# Patient Record
Sex: FEMALE | Race: WHITE | NOT HISPANIC OR LATINO | Employment: FULL TIME | URBAN - METROPOLITAN AREA
[De-identification: names, ages, dates, MRNs, and addresses within clinical notes are randomized per-mention and may not be internally consistent; named-entity substitution may affect disease eponyms.]

---

## 2024-11-25 ENCOUNTER — OFFICE VISIT (OUTPATIENT)
Dept: FAMILY MEDICINE | Facility: CLINIC | Age: 24
End: 2024-11-25
Payer: COMMERCIAL

## 2024-11-25 VITALS
OXYGEN SATURATION: 96 % | WEIGHT: 292.81 LBS | HEART RATE: 82 BPM | RESPIRATION RATE: 18 BRPM | BODY MASS INDEX: 53.55 KG/M2 | SYSTOLIC BLOOD PRESSURE: 118 MMHG | DIASTOLIC BLOOD PRESSURE: 74 MMHG

## 2024-11-25 DIAGNOSIS — J03.90 TONSILLITIS: Primary | ICD-10-CM

## 2024-11-25 DIAGNOSIS — J02.9 SORE THROAT: ICD-10-CM

## 2024-11-25 LAB
CTP QC/QA: YES
MOLECULAR STREP A: NEGATIVE

## 2024-11-25 PROCEDURE — 3044F HG A1C LEVEL LT 7.0%: CPT | Mod: S$GLB,,,

## 2024-11-25 PROCEDURE — 87651 STREP A DNA AMP PROBE: CPT | Mod: QW,S$GLB,,

## 2024-11-25 PROCEDURE — 3074F SYST BP LT 130 MM HG: CPT | Mod: S$GLB,,,

## 2024-11-25 PROCEDURE — 3008F BODY MASS INDEX DOCD: CPT | Mod: S$GLB,,,

## 2024-11-25 PROCEDURE — 1159F MED LIST DOCD IN RCRD: CPT | Mod: S$GLB,,,

## 2024-11-25 PROCEDURE — 3078F DIAST BP <80 MM HG: CPT | Mod: S$GLB,,,

## 2024-11-25 PROCEDURE — 1160F RVW MEDS BY RX/DR IN RCRD: CPT | Mod: S$GLB,,,

## 2024-11-25 PROCEDURE — 99999 PR PBB SHADOW E&M-EST. PATIENT-LVL III: CPT | Mod: PBBFAC,,,

## 2024-11-25 PROCEDURE — 99212 OFFICE O/P EST SF 10 MIN: CPT | Mod: S$GLB,,,

## 2024-11-25 RX ORDER — AMOXICILLIN 500 MG/1
500 TABLET, FILM COATED ORAL EVERY 12 HOURS
Qty: 20 TABLET | Refills: 0 | Status: SHIPPED | OUTPATIENT
Start: 2024-11-25 | End: 2024-12-05

## 2024-11-25 NOTE — PROGRESS NOTES
"[place], [place]    Patient ID: Anna Tabares is a 24 y.o. female.    Chief Complaint: Sore Throat (Patient presents for sore throat, cough and recent ear pain. She's been taking OTC medication for throat "drip" and states it hasn't helped. )    History of Present Illness  Patient presents today with a sore throat.    HISTORY OF PRESENT ILLNESS:  She reports a very bad sore throat with swollen tonsils. Symptoms have been present for 2-3 days, less than five days in total. She has a history of frequent tonsil issues. She also reports a mild cough, related to the sore throat, with no significant sputum production or drainage. She experiences ear discomfort, describing her ears as "a little sore," without drainage. The ear discomfort is suspected to be related to her sore throat and associated inflammation. She has attempted self-treatment for her condition.    ROS  General: -fever, -chills, -fatigue, -weight gain, -weight loss  Eyes: -vision changes, -redness, -discharge  ENT: -ear pain, -nasal congestion, +sore throat  Cardiovascular: -chest pain, -palpitations, -lower extremity edema  Respiratory: +cough, -shortness of breath  Gastrointestinal: -abdominal pain, -nausea, -vomiting, -diarrhea, -constipation, -blood in stool  Genitourinary: -dysuria, -hematuria, -frequency  Musculoskeletal: -joint pain, -muscle pain  Skin: -rash, -lesion  Neurological: -headache, -dizziness, -numbness, -tingling  Psychiatric: -anxiety, -depression, -sleep difficulty     Physical Exam   General: No acute distress. Obese.   Eyes: EOMI. Sclerae anicteric.  HENT: Normocephalic. Atraumatic. Nares patent. Moist oral mucosa. 3+ tonsillar edema bilaterally, no exudates noted. Significant pharyngeal erythema.   Ears: Bilateral TMs clear. Bilateral EACs clear.   Cardiovascular: Regular rate. Regular rhythm. No murmurs. No rubs. No gallops. Normal S1, S2.  Respiratory: Normal respiratory effort. Clear to auscultation " "bilaterally. No rales. No rhonchi. No wheezing.  Abdomen: Soft. Non-tender. Non-distended. Normoactive bowel sounds.  Musculoskeletal: No  obvious deformity.  Extremities: No lower extremity edema.  Neurological: Alert & oriented x3. No slurred speech. Normal gait.  Psychiatric: Normal mood. Normal affect. Good insight. Good judgment.  Skin: Warm. Dry. No rash.   Neck: Moderate anterior cervical lymphadenopathy.     Assessment & Plan  Anna Bush" was seen today for sore throat.    Diagnoses and all orders for this visit:    Tonsillitis    Sore throat  -     POCT Strep A, Molecular    Other orders  -     amoxicillin (AMOXIL) 500 MG Tab; Take 1 tablet (500 mg total) by mouth every 12 (twelve) hours. for 10 days      Suspected strep throat despite negative rapid strep test due to clinical presentation (swollen tonsils, sore throat, ear discomfort)  Will treat empirically with antibiotics due to symptom severity and upcoming travel plans    ACUTE TONSILLITIS:  - Negative strep test in office.   - Started amoxicillin.  - Recommend taking yogurt or a probiotic while on amoxicillin to prevent GI side effects.  - Discussed contagious nature of illness  - Symptoms should start improving within 24-48 hours of starting antibiotics.  - Patient to change toothbrush 3 days after starting antibiotics.  - Recommend drinking plenty of fluids.  - Patient can use warm salt water gargles or chloraseptic numbing sprays for throat pain relief.  - Started amoxicillin.  - Recommend taking yogurt or a probiotic while on amoxicillin to prevent GI side effects.  - Continue Tylenol and Motrin as needed for pain relief.    RTC prn. To ED for emergent s/s. Med and adverse effects discussed.     This note was generated with the assistance of ambient listening technology. Verbal consent was obtained by the patient and accompanying visitor(s) for the recording of patient appointment to facilitate this note. I attest to having reviewed and edited " the generated note for accuracy, though some syntax or spelling errors may persist. Please contact the author of this note for any clarification.        ERICA RiddleC

## 2025-01-03 NOTE — PROGRESS NOTES
"Subjective:       Patient ID: Anna Tabares is a 24 y.o. female.    Chief Complaint: Medication Management (Getting on meds for weight loss and mental health.) and Irritable Bowel Syndrome    Anna Tabares (Gabbi) presents to the clinic today wellness  Established patient within the clinic, new patient to myself    Under the care of the following:  PCP: Dr. Conteh  Gynecology: DONNA Mart     Patient Active Problem List  Diagnosis  · Adolescent idiopathic scoliosis of lumbar region  · Moderate episode of recurrent major depressive disorder  · Pap smear for cervical cancer screening  · Attention deficit hyperactivity disorder (ADHD), combined type    Depression/Anxiety  Long standing hx- reports she has been on medications/ therapy since age 6  Reports multiple medication trials and failures over the years  Reports being on Autism Spectrum  States has not seen a mental health/behavioral health provider since 2021  Available records shows past medications to include the following: most recent Lexapro, vyvanse, viibryd, cymbalta, buspar, zoloft.   Reports has not been in due to insurance issues  Reports left her job at [employer] in August, got  at the end of August  Started a new job at [employer] in November    Reports increased anxiety along with depression, anxiety comes out in forms of irritability with outburst. Depression related to recent weight gain despite monitoring diet  Reports issues with her stomach- loose stools, bloating, expresses concerns of IBS. Has been increasing vegetables as she does will with this.   Interested in weight loss medications.       Review of Systems    Patient Active Problem List   Diagnosis    Adolescent idiopathic scoliosis of lumbar region    Moderate episode of recurrent major depressive disorder    Pap smear for cervical cancer screening    Attention deficit hyperactivity disorder (ADHD), combined type       Objective:      Physical Exam  Vitals and nursing note " "reviewed.   Constitutional:       General: She is not in acute distress.     Appearance: Normal appearance. She is obese. She is not ill-appearing.   Eyes:      Extraocular Movements: Extraocular movements intact.      Conjunctiva/sclera: Conjunctivae normal.   Cardiovascular:      Rate and Rhythm: Normal rate and regular rhythm.      Heart sounds: Normal heart sounds. No murmur heard.  Pulmonary:      Effort: Pulmonary effort is normal. No respiratory distress.      Breath sounds: Normal breath sounds. No wheezing.   Abdominal:      General: Bowel sounds are normal. There is no distension.   Skin:     General: Skin is warm and dry.      Findings: No rash.   Neurological:      Mental Status: She is alert and oriented to person, place, and time. Mental status is at baseline.   Psychiatric:         Mood and Affect: Mood normal.         Behavior: Behavior normal.         Thought Content: Thought content normal.         Judgment: Judgment normal.         Lab Results   Component Value Date    WBC 8.69 06/28/2024    HGB 12.6 06/28/2024    HCT 38.8 06/28/2024     06/28/2024    CHOL 143 06/28/2024    TRIG 51 06/28/2024    HDL 47 06/28/2024     06/28/2024    K 4.4 06/28/2024     06/28/2024    CREATININE 0.8 06/28/2024    BUN 13 06/28/2024    CO2 26 06/28/2024    TSH 2.100 06/28/2024    HGBA1C 5.2 06/28/2024     The ASCVD Risk score ([place] DK, et al., 2019) failed to calculate for the following reasons:    The 2019 ASCVD risk score is only valid for ages 40 to 79  Visit Vitals  /78 (BP Location: Right arm, Patient Position: Sitting)   Pulse 71   Resp 15   Ht 5' 2" (1.575 m)   Wt (!) 137.2 kg (302 lb 6.4 oz)   SpO2 97%   BMI 55.31 kg/m²      Assessment:       1. Encounter for adult wellness visit    2. Encounter for lipid screening for cardiovascular disease    3. Fatigue, unspecified type    4. JOSSE (generalized anxiety disorder)    5. Moderate episode of recurrent major depressive disorder    6. " Morbid obesity with body mass index (BMI) of 50.0 to 59.9 in adult    7. Generalized abdominal pain    8. Autism disorder        Plan:       1. Encounter for adult wellness visit  -     Comprehensive metabolic panel; Future; Expected date: 01/07/2025  -     Lipid panel; Future; Expected date: 01/07/2025  Obtain fasting blood work for review.   2. Encounter for lipid screening for cardiovascular disease  -     Lipid panel; Future; Expected date: 01/07/2025    3. Fatigue, unspecified type  -     TSH; Future; Expected date: 01/07/2025  -     CBC auto differential; Future; Expected date: 01/07/2025    4. JOSSE (generalized anxiety disorder)  -     EScitalopram oxalate (LEXAPRO) 10 MG tablet; Take 1 tablet (10 mg total) by mouth once daily.  Dispense: 90 tablet; Refill: 1  -     busPIRone (BUSPAR) 10 MG tablet; Take 1 tablet (10 mg total) by mouth 3 (three) times daily as needed (anxiety).  Dispense: 90 tablet; Refill: 11  Will restart last prescribed medications.  Discussed referral to behavioral health/ mental health for re-evaluation   Provided contact information to [facility] in [place]   Restart lexapro daily- allow time to reach therapeutic level  Buspar as needed for anxiety     5. Moderate episode of recurrent major depressive disorder  -     EScitalopram oxalate (LEXAPRO) 10 MG tablet; Take 1 tablet (10 mg total) by mouth once daily.  Dispense: 90 tablet; Refill: 1    6. Morbid obesity with body mass index (BMI) of 50.0 to 59.9 in adult  Discussed Zepbound/Wegovy with patient  Unsure of insurance coverage- patient to notify clinic of coverage  7. Generalized abdominal pain  -     US Abdomen Complete; Future; Expected date: 01/07/2025    8. Autism disorder       No follow-ups on file.      Future Appointments       Date Provider Specialty Appt Notes    1/14/2025  Lab lab    1/14/2025  Radiology     3/13/2025 Evelin Coneth MD Family Medicine Follow up

## 2025-01-07 ENCOUNTER — PATIENT MESSAGE (OUTPATIENT)
Dept: FAMILY MEDICINE | Facility: CLINIC | Age: 25
End: 2025-01-07

## 2025-01-07 ENCOUNTER — OFFICE VISIT (OUTPATIENT)
Dept: FAMILY MEDICINE | Facility: CLINIC | Age: 25
End: 2025-01-07
Payer: COMMERCIAL

## 2025-01-07 VITALS
SYSTOLIC BLOOD PRESSURE: 122 MMHG | DIASTOLIC BLOOD PRESSURE: 78 MMHG | HEIGHT: 62 IN | WEIGHT: 293 LBS | OXYGEN SATURATION: 97 % | HEART RATE: 71 BPM | BODY MASS INDEX: 53.92 KG/M2 | RESPIRATION RATE: 15 BRPM

## 2025-01-07 DIAGNOSIS — Z13.220 ENCOUNTER FOR LIPID SCREENING FOR CARDIOVASCULAR DISEASE: ICD-10-CM

## 2025-01-07 DIAGNOSIS — F41.1 GAD (GENERALIZED ANXIETY DISORDER): ICD-10-CM

## 2025-01-07 DIAGNOSIS — R10.84 GENERALIZED ABDOMINAL PAIN: ICD-10-CM

## 2025-01-07 DIAGNOSIS — E66.01 MORBID OBESITY WITH BODY MASS INDEX (BMI) OF 50.0 TO 59.9 IN ADULT: ICD-10-CM

## 2025-01-07 DIAGNOSIS — F33.1 MODERATE EPISODE OF RECURRENT MAJOR DEPRESSIVE DISORDER: ICD-10-CM

## 2025-01-07 DIAGNOSIS — Z00.00 ENCOUNTER FOR ADULT WELLNESS VISIT: Primary | ICD-10-CM

## 2025-01-07 DIAGNOSIS — Z13.6 ENCOUNTER FOR LIPID SCREENING FOR CARDIOVASCULAR DISEASE: ICD-10-CM

## 2025-01-07 DIAGNOSIS — R53.83 FATIGUE, UNSPECIFIED TYPE: ICD-10-CM

## 2025-01-07 DIAGNOSIS — F84.0 AUTISM DISORDER: ICD-10-CM

## 2025-01-07 PROCEDURE — 1160F RVW MEDS BY RX/DR IN RCRD: CPT | Mod: S$GLB,,, | Performed by: NURSE PRACTITIONER

## 2025-01-07 PROCEDURE — 99395 PREV VISIT EST AGE 18-39: CPT | Mod: S$GLB,,, | Performed by: NURSE PRACTITIONER

## 2025-01-07 PROCEDURE — 1159F MED LIST DOCD IN RCRD: CPT | Mod: S$GLB,,, | Performed by: NURSE PRACTITIONER

## 2025-01-07 PROCEDURE — 3008F BODY MASS INDEX DOCD: CPT | Mod: S$GLB,,, | Performed by: NURSE PRACTITIONER

## 2025-01-07 PROCEDURE — 99999 PR PBB SHADOW E&M-EST. PATIENT-LVL IV: CPT | Mod: PBBFAC,,, | Performed by: NURSE PRACTITIONER

## 2025-01-07 PROCEDURE — 3074F SYST BP LT 130 MM HG: CPT | Mod: S$GLB,,, | Performed by: NURSE PRACTITIONER

## 2025-01-07 PROCEDURE — 3078F DIAST BP <80 MM HG: CPT | Mod: S$GLB,,, | Performed by: NURSE PRACTITIONER

## 2025-01-07 RX ORDER — ESCITALOPRAM OXALATE 10 MG/1
10 TABLET ORAL DAILY
Qty: 90 TABLET | Refills: 1 | Status: SHIPPED | OUTPATIENT
Start: 2025-01-07

## 2025-01-07 RX ORDER — BUSPIRONE HYDROCHLORIDE 10 MG/1
10 TABLET ORAL 3 TIMES DAILY PRN
Qty: 90 TABLET | Refills: 11 | Status: SHIPPED | OUTPATIENT
Start: 2025-01-07

## 2025-01-07 NOTE — PATIENT INSTRUCTIONS
Behavioral Health:     [address] [place]  [address] [phone]      Weight Loss:  Speak with your insurance to determine if the following are on formulary/ covered by your insurance:  Zepbound  Wegovy    Both are once weekly injectables

## 2025-01-14 ENCOUNTER — HOSPITAL ENCOUNTER (OUTPATIENT)
Dept: RADIOLOGY | Facility: HOSPITAL | Age: 25
Discharge: HOME | End: 2025-01-14
Attending: NURSE PRACTITIONER
Payer: COMMERCIAL

## 2025-01-14 DIAGNOSIS — R10.84 GENERALIZED ABDOMINAL PAIN: ICD-10-CM

## 2025-01-14 PROCEDURE — 76700 US EXAM ABDOM COMPLETE: CPT | Mod: 26,,, | Performed by: RADIOLOGY

## 2025-01-14 PROCEDURE — 76700 US EXAM ABDOM COMPLETE: CPT | Mod: TC

## 2025-03-07 ENCOUNTER — PATIENT MESSAGE (OUTPATIENT)
Dept: FAMILY MEDICINE | Facility: CLINIC | Age: 25
End: 2025-03-07
Payer: COMMERCIAL

## 2025-03-13 ENCOUNTER — LAB VISIT (OUTPATIENT)
Dept: LAB | Facility: HOSPITAL | Age: 25
End: 2025-03-13
Attending: NURSE PRACTITIONER
Payer: COMMERCIAL

## 2025-03-13 ENCOUNTER — OFFICE VISIT (OUTPATIENT)
Dept: FAMILY MEDICINE | Facility: CLINIC | Age: 25
End: 2025-03-13
Payer: COMMERCIAL

## 2025-03-13 VITALS
HEART RATE: 81 BPM | WEIGHT: 290 LBS | BODY MASS INDEX: 53.37 KG/M2 | HEIGHT: 62 IN | SYSTOLIC BLOOD PRESSURE: 126 MMHG | RESPIRATION RATE: 15 BRPM | DIASTOLIC BLOOD PRESSURE: 82 MMHG | OXYGEN SATURATION: 95 %

## 2025-03-13 DIAGNOSIS — E66.01 MORBID OBESITY WITH BODY MASS INDEX (BMI) OF 50.0 TO 59.9 IN ADULT: ICD-10-CM

## 2025-03-13 DIAGNOSIS — R89.9 ABNORMAL LABORATORY TEST: ICD-10-CM

## 2025-03-13 DIAGNOSIS — M79.18 MYALGIA, MULTIPLE SITES: ICD-10-CM

## 2025-03-13 DIAGNOSIS — F32.A DEPRESSIVE DISORDER: Primary | ICD-10-CM

## 2025-03-13 DIAGNOSIS — M25.50 GENERALIZED JOINT PAIN: ICD-10-CM

## 2025-03-13 LAB
CRP SERPL-MCNC: 16 MG/L (ref 0–8.2)
ERYTHROCYTE [SEDIMENTATION RATE] IN BLOOD BY WESTERGREN METHOD: 30 MM/HR (ref 0–20)
IRON SERPL-MCNC: 89 UG/DL (ref 30–160)
MAGNESIUM SERPL-MCNC: 2 MG/DL (ref 1.6–2.6)
SATURATED IRON: 23 % (ref 20–50)
TOTAL IRON BINDING CAPACITY: 380 UG/DL (ref 250–450)
TRANSFERRIN SERPL-MCNC: 257 MG/DL (ref 200–375)

## 2025-03-13 PROCEDURE — 3079F DIAST BP 80-89 MM HG: CPT | Mod: S$GLB,,, | Performed by: NURSE PRACTITIONER

## 2025-03-13 PROCEDURE — 83540 ASSAY OF IRON: CPT | Performed by: NURSE PRACTITIONER

## 2025-03-13 PROCEDURE — 86140 C-REACTIVE PROTEIN: CPT | Performed by: NURSE PRACTITIONER

## 2025-03-13 PROCEDURE — 99999 PR PBB SHADOW E&M-EST. PATIENT-LVL III: CPT | Mod: PBBFAC,,, | Performed by: NURSE PRACTITIONER

## 2025-03-13 PROCEDURE — 86038 ANTINUCLEAR ANTIBODIES: CPT | Performed by: NURSE PRACTITIONER

## 2025-03-13 PROCEDURE — 36415 COLL VENOUS BLD VENIPUNCTURE: CPT | Performed by: NURSE PRACTITIONER

## 2025-03-13 PROCEDURE — 86039 ANTINUCLEAR ANTIBODIES (ANA): CPT | Performed by: NURSE PRACTITIONER

## 2025-03-13 PROCEDURE — 99214 OFFICE O/P EST MOD 30 MIN: CPT | Mod: S$GLB,,, | Performed by: NURSE PRACTITIONER

## 2025-03-13 PROCEDURE — 3074F SYST BP LT 130 MM HG: CPT | Mod: S$GLB,,, | Performed by: NURSE PRACTITIONER

## 2025-03-13 PROCEDURE — 83735 ASSAY OF MAGNESIUM: CPT | Performed by: NURSE PRACTITIONER

## 2025-03-13 PROCEDURE — 85651 RBC SED RATE NONAUTOMATED: CPT | Performed by: NURSE PRACTITIONER

## 2025-03-13 PROCEDURE — 3008F BODY MASS INDEX DOCD: CPT | Mod: S$GLB,,, | Performed by: NURSE PRACTITIONER

## 2025-03-13 PROCEDURE — 82306 VITAMIN D 25 HYDROXY: CPT | Performed by: NURSE PRACTITIONER

## 2025-03-13 PROCEDURE — 1159F MED LIST DOCD IN RCRD: CPT | Mod: S$GLB,,, | Performed by: NURSE PRACTITIONER

## 2025-03-13 PROCEDURE — 86235 NUCLEAR ANTIGEN ANTIBODY: CPT | Mod: 59 | Performed by: NURSE PRACTITIONER

## 2025-03-13 RX ORDER — ESCITALOPRAM OXALATE 20 MG/1
20 TABLET ORAL DAILY
Qty: 90 TABLET | Refills: 3 | Status: SHIPPED | OUTPATIENT
Start: 2025-03-13 | End: 2026-03-13

## 2025-03-13 RX ORDER — TOPIRAMATE 25 MG/1
25 TABLET ORAL 2 TIMES DAILY
COMMUNITY
Start: 2025-02-24

## 2025-03-13 RX ORDER — PHENTERMINE HYDROCHLORIDE 37.5 MG/1
37.5 TABLET ORAL DAILY
COMMUNITY
Start: 2025-01-17

## 2025-03-13 NOTE — PROGRESS NOTES
"To Subjective:       Patient ID: Anna Tabares is a 24 y.o. female.    Chief Complaint: Follow-up (3 month), Medication Management (Wants to discuss lexapro), and Fibromyalgia (Wants to discuss)    Anna Tabares (Gabbi) presents to the clinic today for follow up Depression/Anxiety        Under the care of the following:  PCP: Dr. Conteh  Gynecology: DONNA Mart   Weight Management/Bariatric Medicine: Trish Nascimento, NP- C with [facility]     Patient Active Problem List  Diagnosis  ·Adolescent idiopathic scoliosis of lumbar region  ·Moderate episode of recurrent major depressive disorder  ·Pap smear for cervical cancer screening  ·Attention deficit hyperactivity disorder (ADHD), combined type     Depression/Anxiety  Long standing hx- reports she has been on medications/ therapy since age 6  Reports multiple medication trials and failures over the years  Reports being on Autism Spectrum  States has not seen a mental health/behavioral health provider since 2021  Available records shows past medications to include the following: most recent Lexapro, vyvanse, viibryd, cymbalta, buspar, zoloft.   Reports has not been in due to insurance issues  Reports left her job at [employer] in August, got  at the end of August  Started a new job at [employer] in November     Reports increased anxiety along with depression, anxiety comes out in forms of irritability with outburst. Depression related to recent weight gain despite monitoring diet  Reports issues with her stomach- loose stools, bloating, expresses concerns of IBS. Has been increasing vegetables as she does will with this.   Interested in weight loss medications.     Patient was started on Lexapro 10 mg and Buspar 10 mg TID   Reports that overall she was doing well with her recent treatment regimen until about 2 weeks ago when she began to have differences with her significant other.   Would like to discuss increase in Lexparo- had been on 20 mg tablets in the " past and reports that she feels like she did well on the medication from what she is able to remember.     Since her last office visit she has been seen by Bariatric Medicine/weight managemnet with [facility] and started on   phentermine (ADIPEX-P) 37.5 mg tablet 1 tablet daily and topiramate (TOPAMAX) 25 MG tablet 1 tablet in the morning and 1 tablet at bedtime    shows fills 1/17/2025 and 2/24/2025  This can definitely impact her underlying anxiety, but reports that she only noticed the recent increase in the past 2 weeks and has been on these medications since January    Expresses concerns related to not only multiple joint pains, but pain/ sensitivity to upper arms, back, legs, at time generalized body along with consistent fatigue  Reports family hx of fibromyalgia on mother's side  Has not noticed and joint swelling to any specific joints, maybe knees at times        Review of Systems    Problem List[1]    Objective:      Physical Exam  Vitals and nursing note reviewed.   Constitutional:       General: She is not in acute distress.     Appearance: Normal appearance. She is obese. She is not ill-appearing.   Eyes:      Extraocular Movements: Extraocular movements intact.      Conjunctiva/sclera: Conjunctivae normal.   Cardiovascular:      Rate and Rhythm: Normal rate and regular rhythm.      Heart sounds: Normal heart sounds. No murmur heard.  Pulmonary:      Effort: Pulmonary effort is normal. No respiratory distress.      Breath sounds: Normal breath sounds. No wheezing.   Abdominal:      General: Bowel sounds are normal. There is no distension.   Musculoskeletal:      Right shoulder: Tenderness present.      Left shoulder: Tenderness present.      Right upper arm: Tenderness present.      Left upper arm: Tenderness present.      Cervical back: Tenderness present.      Lumbar back: Tenderness present.      Right lower leg: Tenderness present.      Left lower leg: Tenderness present.      Comments:  "Tenderness is noted more to touch/palpation not necessarily joint related    Skin:     General: Skin is warm and dry.      Findings: No rash.   Neurological:      Mental Status: She is alert and oriented to person, place, and time. Mental status is at baseline.   Psychiatric:         Mood and Affect: Mood normal.         Behavior: Behavior normal.         Thought Content: Thought content normal.         Judgment: Judgment normal.         Lab Results   Component Value Date    WBC 9.52 01/14/2025    HGB 12.7 01/14/2025    HCT 39.5 01/14/2025     01/14/2025    CHOL 142 01/14/2025    TRIG 53 01/14/2025    HDL 47 01/14/2025    ALT 28 01/14/2025    AST 19 01/14/2025     01/14/2025    K 4.3 01/14/2025     01/14/2025    CREATININE 0.8 01/14/2025    BUN 13 01/14/2025    CO2 22 (L) 01/14/2025    TSH 3.375 01/14/2025    HGBA1C 5.2 06/28/2024     The ASCVD Risk score (Em CHU, et al., 2019) failed to calculate for the following reasons:    The 2019 ASCVD risk score is only valid for ages 40 to 79  Visit Vitals  /82 (BP Location: Right arm, Patient Position: Sitting)   Pulse 81   Resp 15   Ht 5' 2" (1.575 m)   Wt 131.5 kg (290 lb)   SpO2 95%   BMI 53.04 kg/m²      Assessment:       1. Morbid obesity with body mass index (BMI) of 50.0 to 59.9 in adult    2. Generalized joint pain    3. Myalgia, multiple sites    4. Abnormal laboratory test        Plan:       1. Depressive Disorder   -     EScitalopram oxalate (LEXAPRO) 20 MG tablet; Take 1 tablet (20 mg total) by mouth once daily.  Dispense: 90 tablet; Refill: 3  Increase Lexapro to 20 mg daily  Continue Buspar as prescribed   Avoid additional intake of stimulants- caffeine as this can increase anxiety    Follow up on medication adjustments in 6-8 weeks   2. Morbid obesity with body mass index (BMI) of 50.0 to 59.9 in adult  -     Vitamin D; Future; Expected date: 03/13/2025  -     Iron and TIBC; Future; Expected date: 03/13/2025    3. Generalized joint " pain  -     C-Reactive Protein; Future; Expected date: 03/13/2025  -     Sedimentation rate; Future; Expected date: 03/13/2025  -     REGLA Screen w/Reflex; Future; Expected date: 03/14/2025  -     Rheumatoid Factor; Future; Expected date: 03/14/2025    4. Myalgia, multiple sites  -     Magnesium; Future; Expected date: 03/13/2025  -     REGLA Screen w/Reflex; Future; Expected date: 03/14/2025  -     Rheumatoid Factor; Future; Expected date: 03/14/2025    5. Abnormal laboratory test  -     REGLA Screen w/Reflex; Future; Expected date: 03/14/2025  -     Rheumatoid Factor; Future; Expected date: 03/14/2025     Obtain blood work for further evaluation of myalgia/joint pain   No follow-ups on file.      Future Appointments       Date Provider Specialty Appt Notes    5/19/2025 Evelin Conteh MD Family Medicine 8 week follow up                  [1]   Patient Active Problem List  Diagnosis    Adolescent idiopathic scoliosis of lumbar region    Moderate episode of recurrent major depressive disorder    Pap smear for cervical cancer screening    Attention deficit hyperactivity disorder (ADHD), combined type    Uses contraception    Depressive disorder    Autism spectrum disorder

## 2025-03-14 PROBLEM — F32.A DEPRESSIVE DISORDER: Status: ACTIVE | Noted: 2019-09-10

## 2025-03-14 PROBLEM — F84.0 AUTISM SPECTRUM DISORDER: Status: ACTIVE | Noted: 2019-11-04

## 2025-03-14 PROBLEM — Z78.9 USES CONTRACEPTION: Status: ACTIVE | Noted: 2021-04-13

## 2025-03-14 LAB — 25(OH)D3+25(OH)D2 SERPL-MCNC: 28 NG/ML (ref 30–96)

## 2025-03-18 LAB
ANA PATTERN 1: NORMAL
ANA SER QL IF: POSITIVE
ANA TITR SER IF: NORMAL {TITER}

## 2025-03-19 LAB
ANTI SM ANTIBODY: 0.08 RATIO (ref 0–0.99)
ANTI SM/RNP ANTIBODY: 0.07 RATIO (ref 0–0.99)
ANTI-SM INTERPRETATION: NEGATIVE
ANTI-SM/RNP INTERPRETATION: NEGATIVE
ANTI-SSA ANTIBODY: 0.08 RATIO (ref 0–0.99)
ANTI-SSA INTERPRETATION: NEGATIVE
ANTI-SSB ANTIBODY: 0.07 RATIO (ref 0–0.99)
ANTI-SSB INTERPRETATION: NEGATIVE
DSDNA AB SER-ACNC: NORMAL [IU]/ML

## 2025-03-20 ENCOUNTER — RESULTS FOLLOW-UP (OUTPATIENT)
Dept: FAMILY MEDICINE | Facility: CLINIC | Age: 25
End: 2025-03-20
Payer: COMMERCIAL

## 2025-03-20 DIAGNOSIS — R76.8 POSITIVE ANA (ANTINUCLEAR ANTIBODY): ICD-10-CM

## 2025-03-20 DIAGNOSIS — M79.18 MYALGIA, MULTIPLE SITES: Primary | ICD-10-CM

## 2025-03-20 DIAGNOSIS — M25.50 GENERALIZED JOINT PAIN: ICD-10-CM

## 2025-03-20 PROCEDURE — 99999 PR PBB SHADOW E&M-EST. PATIENT-LVL I: CPT | Mod: PBBFAC,,, | Performed by: NURSE PRACTITIONER

## 2025-03-20 NOTE — TELEPHONE ENCOUNTER
Referral placed to Dr. Hernandez with [facility] provider in clinic is Dr. Del Rosario    Please submit referral and notify patient  She will need to make contact with their office for scheduling.  [phone]

## 2025-03-27 ENCOUNTER — TELEPHONE (OUTPATIENT)
Dept: FAMILY MEDICINE | Facility: CLINIC | Age: 25
End: 2025-03-27
Payer: COMMERCIAL

## 2025-03-27 DIAGNOSIS — R76.8 POSITIVE ANA (ANTINUCLEAR ANTIBODY): Primary | ICD-10-CM

## 2025-03-27 DIAGNOSIS — M25.50 GENERALIZED JOINT PAIN: ICD-10-CM

## 2025-03-27 DIAGNOSIS — M79.18 MYALGIA, MULTIPLE SITES: ICD-10-CM

## 2025-03-27 NOTE — TELEPHONE ENCOUNTER
----- Message from Xin Gilman MA sent at 3/26/2025  7:28 AM CDT -----      ----- Message -----  From: Janet Wallace NP  Sent: 3/20/2025   2:58 PM CDT  To: Rodrigo Gonzales Staff    ----- Message from Janet Wallace NP sent at 3/20/2025  2:58 PM CDT -----      ----- Message -----  From: Janet Wlalace NP  Sent: 3/20/2025   2:24 PM CDT  To: Rodrigo Gonzales Staff    Needing to place Rheumatology referral- does patient want to see [facility] provider (will not be in [place]) or be referred externally ([facility])?   ----- Message -----  From: [facility] Interface  Sent: 3/13/2025   6:22 PM CDT  To: Janet Wallace NP

## 2025-06-18 ENCOUNTER — HOSPITAL ENCOUNTER (OUTPATIENT)
Dept: RADIOLOGY | Facility: HOSPITAL | Age: 25
Discharge: HOME | End: 2025-06-18
Attending: INTERNAL MEDICINE
Payer: COMMERCIAL

## 2025-06-18 ENCOUNTER — OFFICE VISIT (OUTPATIENT)
Dept: RHEUMATOLOGY | Facility: CLINIC | Age: 25
End: 2025-06-18
Payer: COMMERCIAL

## 2025-06-18 VITALS
DIASTOLIC BLOOD PRESSURE: 84 MMHG | HEIGHT: 62 IN | SYSTOLIC BLOOD PRESSURE: 126 MMHG | BODY MASS INDEX: 52.54 KG/M2 | HEART RATE: 112 BPM | WEIGHT: 285.5 LBS

## 2025-06-18 DIAGNOSIS — M25.50 GENERALIZED JOINT PAIN: ICD-10-CM

## 2025-06-18 DIAGNOSIS — M79.18 MYALGIA, MULTIPLE SITES: ICD-10-CM

## 2025-06-18 DIAGNOSIS — R21 RASH: ICD-10-CM

## 2025-06-18 DIAGNOSIS — R76.8 POSITIVE ANA (ANTINUCLEAR ANTIBODY): ICD-10-CM

## 2025-06-18 PROCEDURE — 99999 PR PBB SHADOW E&M-EST. PATIENT-LVL IV: CPT | Mod: PBBFAC,,, | Performed by: INTERNAL MEDICINE

## 2025-06-18 PROCEDURE — 77077 JOINT SURVEY SINGLE VIEW: CPT | Mod: 26,,, | Performed by: RADIOLOGY

## 2025-06-18 PROCEDURE — 3008F BODY MASS INDEX DOCD: CPT | Mod: CPTII,S$GLB,, | Performed by: INTERNAL MEDICINE

## 2025-06-18 PROCEDURE — 99205 OFFICE O/P NEW HI 60 MIN: CPT | Mod: S$GLB,,, | Performed by: INTERNAL MEDICINE

## 2025-06-18 PROCEDURE — 3079F DIAST BP 80-89 MM HG: CPT | Mod: CPTII,S$GLB,, | Performed by: INTERNAL MEDICINE

## 2025-06-18 PROCEDURE — 77077 JOINT SURVEY SINGLE VIEW: CPT | Mod: TC

## 2025-06-18 PROCEDURE — 3074F SYST BP LT 130 MM HG: CPT | Mod: CPTII,S$GLB,, | Performed by: INTERNAL MEDICINE

## 2025-06-18 PROCEDURE — 72100 X-RAY EXAM L-S SPINE 2/3 VWS: CPT | Mod: TC

## 2025-06-18 PROCEDURE — 72100 X-RAY EXAM L-S SPINE 2/3 VWS: CPT | Mod: 26,,, | Performed by: RADIOLOGY

## 2025-06-18 PROCEDURE — 1159F MED LIST DOCD IN RCRD: CPT | Mod: CPTII,S$GLB,, | Performed by: INTERNAL MEDICINE

## 2025-06-18 NOTE — PROGRESS NOTES
"Subjective:      Patient ID: Anna Tabares is a 24 y.o. female.    Chief Complaint: Disease Management    HPI 24 year old F  with no significant PMH here for evaluation of ADHD, migraines, anxiety, depression here for evaluation of +REGLA. She has been having pain since age 19. It has worsened.She also is very  fatigued.  She also breaks out in hives. She gets pain in shoulders, hands, hips, lower back, knees,ankles, and feet.  Pain level can as high as 10/10.   She gets swelling hands and ankles for a while. She is stiff in hands, hips, and lower back. She notices hives off and on for 3 years.   She will take ibuprofen and tylenol with improvement.Denies oral ulcers.  Denies Raynauds.  Reports hair shedding.  Denies bloody stools.  Denies smoking. She reports easy bruising.      Family hx: grandmother and cousin: AS       Past Medical History:   Diagnosis Date    ADHD (attention deficit hyperactivity disorder)     Amenorrhea     Anxiety     Depression              Objective:   /84   Pulse (!) 112   Ht 5' 2" (1.575 m)   Wt 129.5 kg (285 lb 7.9 oz)   BMI 52.22 kg/m²   Physical Exam   Constitutional: She is oriented to person, place, and time.   HENT:   Head: Normocephalic and atraumatic.   Right Ear: External ear normal.   Left Ear: External ear normal.   Nose: Nose normal.   Mouth/Throat: Oropharynx is clear and moist. No oropharyngeal exudate.   Eyes: Pupils are equal, round, and reactive to light. Conjunctivae are normal. Right eye exhibits no discharge. Left eye exhibits no discharge. No scleral icterus.   Neck: No JVD present. No thyromegaly present.   Cardiovascular: Normal rate, regular rhythm and normal heart sounds. Exam reveals no gallop and no friction rub.   No murmur heard.  Pulmonary/Chest: Effort normal and breath sounds normal. No respiratory distress. She has no wheezes. She has no rales. She exhibits no tenderness.   Abdominal: Soft. Bowel sounds are normal. She exhibits no distension and " no mass. There is no abdominal tenderness. There is no rebound and no guarding.   Musculoskeletal:         General: Tenderness present. No swelling or deformity.      Cervical back: Neck supple.   Lymphadenopathy:     She has no cervical adenopathy.   Neurological: She is alert and oriented to person, place, and time. No cranial nerve deficit. Gait normal. Coordination normal.   Skin: Skin is dry. No rash noted. No erythema. No pallor.   Diffuse body tenderness  Hard to assess synovitis given body habitus   Psychiatric: Affect and judgment normal.       No data to display     Assessment:   24 year old F  with no significant PMH here for evaluation of ADHD, migraines, anxiety, depression here for evaluation of +REGLA.    1. Positive REGLA (antinuclear antibody)    2. Myalgia, multiple sites    3. Generalized joint pain      +REGLA-patient with +REGLA but negative profile. Given her arthralgias, will do further work up.  She has redness in her cheeks and reports hives, so I will get input from dermatology as well.  Labs  Xrays    # easy bruising: labs    # rashes: her face appears like rosacea but will get further input from derm.    #Joint pain: hard to assess synovitis given body habitus.  Labs  Xrays  Consider MRI of hands    #Obesity: risks of obesity discussed including increased inflammation.  -discussed diet including anti-inflammatory diet and handout was given  -encouraged daily exercise 30 minutes a day       Plan:     Problem List Items Addressed This Visit    None  Visit Diagnoses         Positive REGLA (antinuclear antibody)        Relevant Orders    Rheumatoid Factor    Cyclic Citrullinated Peptide Antibody, IgG    C4 Complement    C3 Complement    Protein/Creatinine Ratio, Urine    Urinalysis      Myalgia, multiple sites          Generalized joint pain              60 * minutes of total time spent on the encounter, which includes face to face time and non-face to face time preparing to see the patient (eg, review  of tests), Obtaining and/or reviewing separately obtained history, Documenting clinical information in the electronic or other health record, Independently interpreting results (not separately reported) and communicating results to the patient/family/caregiver, or Care coordination (not separately reported). -

## 2025-06-18 NOTE — PROGRESS NOTES
6/11/2025    10:49 AM   Rapid3 Question Responses and Scores   MDHAQ Score 1.1   Psychologic Score 4.4   Pain Score 8.5   When you awakened in the morning OVER THE LAST WEEK, did you feel stiff? Yes   If Yes, please indicate the number of hours until you are as limber as you will be for the day 6   Fatigue Score 10   Global Health Score 7.5   RAPID3 Score 6.56     Answers submitted by the patient for this visit:  Rheumatology Questionnaire (Submitted on 6/11/2025)  fever: No  eye redness: No  mouth sores: Yes  headaches: Yes  shortness of breath: Yes  chest pain: No  trouble swallowing: No  diarrhea: No  constipation: No  unexpected weight change: No  genital sore: No  dysuria: No  During the last 3 days, have you had a skin rash?: Yes  Bruises or bleeds easily: Yes  cough: No

## 2025-06-20 ENCOUNTER — TELEPHONE (OUTPATIENT)
Dept: RHEUMATOLOGY | Facility: CLINIC | Age: 25
End: 2025-06-20
Payer: COMMERCIAL

## 2025-06-20 ENCOUNTER — RESULTS FOLLOW-UP (OUTPATIENT)
Dept: RHEUMATOLOGY | Facility: CLINIC | Age: 25
End: 2025-06-20

## 2025-06-20 DIAGNOSIS — M25.532 BILATERAL WRIST PAIN: ICD-10-CM

## 2025-06-20 DIAGNOSIS — M79.89 BILATERAL HAND SWELLING: ICD-10-CM

## 2025-06-20 DIAGNOSIS — M25.531 BILATERAL WRIST PAIN: ICD-10-CM

## 2025-06-20 DIAGNOSIS — M79.642 BILATERAL HAND PAIN: Primary | ICD-10-CM

## 2025-06-20 DIAGNOSIS — M79.641 BILATERAL HAND PAIN: Primary | ICD-10-CM

## 2025-07-11 ENCOUNTER — HOSPITAL ENCOUNTER (OUTPATIENT)
Dept: RADIOLOGY | Facility: HOSPITAL | Age: 25
Discharge: HOME | End: 2025-07-11
Attending: INTERNAL MEDICINE
Payer: COMMERCIAL

## 2025-07-11 DIAGNOSIS — M79.642 BILATERAL HAND PAIN: ICD-10-CM

## 2025-07-11 DIAGNOSIS — M25.532 BILATERAL WRIST PAIN: ICD-10-CM

## 2025-07-11 DIAGNOSIS — M79.89 BILATERAL HAND SWELLING: ICD-10-CM

## 2025-07-11 DIAGNOSIS — M25.531 BILATERAL WRIST PAIN: ICD-10-CM

## 2025-07-11 DIAGNOSIS — M79.641 BILATERAL HAND PAIN: ICD-10-CM

## 2025-07-11 PROCEDURE — A9585 GADOBUTROL INJECTION: HCPCS | Performed by: INTERNAL MEDICINE

## 2025-07-11 PROCEDURE — 73223 MRI JOINT UPR EXTR W/O&W/DYE: CPT | Mod: 26,,, | Performed by: RADIOLOGY

## 2025-07-11 PROCEDURE — 25500020 PHARM REV CODE 255: Performed by: INTERNAL MEDICINE

## 2025-07-11 PROCEDURE — 73223 MRI JOINT UPR EXTR W/O&W/DYE: CPT | Mod: TC,50

## 2025-07-11 RX ORDER — GADOBUTROL 604.72 MG/ML
10 INJECTION INTRAVENOUS
Status: COMPLETED | OUTPATIENT
Start: 2025-07-11 | End: 2025-07-11

## 2025-07-11 RX ADMIN — GADOBUTROL 10 ML: 604.72 INJECTION INTRAVENOUS at 07:07
